# Patient Record
Sex: FEMALE | Race: WHITE | Employment: FULL TIME | URBAN - METROPOLITAN AREA
[De-identification: names, ages, dates, MRNs, and addresses within clinical notes are randomized per-mention and may not be internally consistent; named-entity substitution may affect disease eponyms.]

---

## 2017-03-31 ENCOUNTER — HOSPITAL ENCOUNTER (OUTPATIENT)
Dept: MAMMOGRAPHY | Age: 47
Discharge: HOME | End: 2017-03-31
Attending: FAMILY MEDICINE
Payer: COMMERCIAL

## 2017-03-31 DIAGNOSIS — Z12.31 VISIT FOR SCREENING MAMMOGRAM: ICD-10-CM

## 2017-03-31 PROCEDURE — 77067 SCR MAMMO BI INCL CAD: CPT

## 2017-04-06 ENCOUNTER — HOSPITAL ENCOUNTER (OUTPATIENT)
Dept: ULTRASOUND IMAGING | Age: 47
Discharge: HOME | End: 2017-04-06
Attending: FAMILY MEDICINE
Payer: COMMERCIAL

## 2017-04-06 ENCOUNTER — HOSPITAL ENCOUNTER (OUTPATIENT)
Dept: MAMMOGRAPHY | Age: 47
Discharge: HOME | End: 2017-04-06
Attending: FAMILY MEDICINE
Payer: COMMERCIAL

## 2017-04-06 DIAGNOSIS — R92.8 ABNORMAL MAMMOGRAM: ICD-10-CM

## 2017-04-06 DIAGNOSIS — N64.89 BREAST ASYMMETRY: ICD-10-CM

## 2017-04-06 PROCEDURE — 77065 DX MAMMO INCL CAD UNI: CPT

## 2017-11-08 ENCOUNTER — OFFICE VISIT (OUTPATIENT)
Dept: ORTHOPEDIC SURGERY | Age: 47
End: 2017-11-08

## 2017-11-08 VITALS
RESPIRATION RATE: 16 BRPM | BODY MASS INDEX: 27.64 KG/M2 | DIASTOLIC BLOOD PRESSURE: 66 MMHG | SYSTOLIC BLOOD PRESSURE: 132 MMHG | WEIGHT: 172 LBS | HEIGHT: 66 IN | TEMPERATURE: 97.9 F | HEART RATE: 67 BPM

## 2017-11-08 DIAGNOSIS — M54.16 LUMBAR RADICULOPATHY: ICD-10-CM

## 2017-11-08 DIAGNOSIS — M54.50 LUMBAR SPINE PAIN: Primary | ICD-10-CM

## 2017-11-08 RX ORDER — IBUPROFEN 800 MG/1
TABLET ORAL
COMMUNITY

## 2017-11-08 RX ORDER — PREDNISONE 10 MG/1
10 TABLET ORAL SEE ADMIN INSTRUCTIONS
Qty: 21 TAB | Refills: 0 | Status: SHIPPED | OUTPATIENT
Start: 2017-11-08 | End: 2018-01-22 | Stop reason: ALTCHOICE

## 2017-11-08 RX ORDER — GABAPENTIN 300 MG/1
300 CAPSULE ORAL 3 TIMES DAILY
Qty: 90 CAP | Refills: 2 | Status: SHIPPED | OUTPATIENT
Start: 2017-11-08 | End: 2018-02-02 | Stop reason: SDUPTHER

## 2017-11-08 NOTE — MR AVS SNAPSHOT
Visit Information Date & Time Provider Department Dept. Phone Encounter #  
 11/8/2017  9:15 AM Hermes Allison MD [facility] [phone] [id number] Follow-up Instructions Return in about 6 weeks (around 12/20/2017), or if symptoms worsen or fail to improve. Upcoming Health Maintenance Date Due  
 PAP AKA CERVICAL CYTOLOGY 8/7/2015 Influenza Age 5 to Adult 8/1/2017 DTaP/Tdap/Td series (2 - Td) 9/24/2020 Allergies as of 11/8/2017  Review Complete On: 11/8/2017 By: Wesley Hidalgo LPN Severity Noted Reaction Type Reactions Pcn [Penicillins]  03/24/2010   Topical Rash All cillins Current Immunizations  Never Reviewed Name Date Influenza Vaccine 12/14/2013 Influenza Vaccine Split 11/13/2012, 9/24/2010 PPD 6/28/2011 TDAP Vaccine 9/24/2010 Not reviewed this visit You Were Diagnosed With   
  
 Codes Comments Lumbar spine pain    -  Primary ICD-10-CM: M54.5 ICD-9-CM: 724.2 Lumbar radiculopathy     ICD-10-CM: M54.16 
ICD-9-CM: 724.4 Vitals BP Pulse Temp Resp Height(growth percentile) Weight(growth percentile) 132/66 67 97.9 °F (36.6 °C) (Oral) 16 5' 6\" (1.676 m) 172 lb (78 kg) BMI OB Status Smoking Status 27.76 kg/m2 Having regular periods Former Smoker BMI and BSA Data Body Mass Index Body Surface Area  
 27.76 kg/m 2 1.91 m 2 Preferred Pharmacy Pharmacy Name Phone [facility], [address] [place] [address] Road [phone] Your Updated Medication List  
  
   
This list is accurate as of: 11/8/17 10:19 AM.  Always use your most recent med list.  
  
  
  
  
 * ADVIL 200 mg tablet Generic drug:  ibuprofen Take  by mouth. * ibuprofen 800 mg tablet Commonly known as:  MOTRIN Take  by mouth. amphetamine-dextroamphetamine XR 25 mg XR capsule Commonly known as:  ADDERALL XR  
 Take 1 Cap by mouth every morning. Fill after 2/14/14 cyclobenzaprine 10 mg tablet Commonly known as:  FLEXERIL Take 1 Tab by mouth three (3) times daily as needed for Muscle Spasm(s). gabapentin 300 mg capsule Commonly known as:  NEURONTIN Take 1 Cap by mouth three (3) times daily. naproxen 500 mg tablet Commonly known as:  NAPROSYN Take 1 Tab by mouth two (2) times daily (with meals). PERCOCET PO Take  by mouth.  
  
 predniSONE 10 mg dose pack Commonly known as:  STERAPRED DS Take 1 Tab by mouth See Admin Instructions. See administration instruction per 10mg dose pack SOMA 350 mg tablet Generic drug:  carisoprodol Take 350 mg by mouth daily. TOPAMAX 25 mg tablet Generic drug:  topiramate Take  by mouth two (2) times a day. zolpidem 10 mg tablet Commonly known as:  AMBIEN Take 1 Tab by mouth nightly as needed for Sleep. * Notice: This list has 2 medication(s) that are the same as other medications prescribed for you. Read the directions carefully, and ask your doctor or other care provider to review them with you. Prescriptions Sent to Pharmacy Refills  
 predniSONE (STERAPRED DS) 10 mg dose pack 0 Sig: Take 1 Tab by mouth See Admin Instructions. See administration instruction per 10mg dose pack Class: Normal  
 Pharmacy: [address], [address] [place] [address] Ph #: [phone] Route: Oral  
 gabapentin (NEURONTIN) 300 mg capsule 2 Sig: Take 1 Cap by mouth three (3) times daily. Class: Normal  
 Pharmacy: [address], [address] [place] [address] Ph #: [phone] Route: Oral  
  
We Performed the Following AMB POC XRAY, SPINE, LUMBOSACRAL; 2 O [49914 CPT(R)] REFERRAL TO PHYSICAL THERAPY [XZN37 Custom] Comments:  
 Please limit referral to location specified. eval and treat; Osiris method Pt has lumbar radiculopathy Follow-up Instructions Return in about 6 weeks (around 12/20/2017), or if symptoms worsen or fail to improve. Referral Information Referral ID Referred By Referred To  
  
 [id number]. [address], [address] [place], [address] Phone: [phone] Visits Status Start Date End Date 1 New Request 11/8/17 11/8/18 If your referral has a status of pending review or denied, additional information will be sent to support the outcome of this decision. Patient Instructions Low Back Pain: Exercises Your Care Instructions Here are some examples of typical rehabilitation exercises for your condition. Start each exercise slowly. Ease off the exercise if you start to have pain. Your doctor or physical therapist will tell you when you can start these exercises and which ones will work best for you. How to do the exercises Press-up 1. Lie on your stomach, supporting your body with your forearms. 2. Press your elbows down into the floor to raise your upper back. As you do this, relax your stomach muscles and allow your back to arch without using your back muscles. As your press up, do not let your hips or pelvis come off the floor. 3. Hold for 15 to 30 seconds, then relax. 4. Repeat 2 to 4 times. Alternate arm and leg (bird dog) exercise Do this exercise slowly. Try to keep your body straight at all times, and do not let one hip drop lower than the other. 1. Start on the floor, on your hands and knees. 2. Tighten your belly muscles. 3. Raise one leg off the floor, and hold it straight out behind you. Be careful not to let your hip drop down, because that will twist your trunk. 4. Hold for about 6 seconds, then lower your leg and switch to the other leg. 5. Repeat 8 to 12 times on each leg. 6. Over time, work up to holding for 10 to 30 seconds each time. 7. If you feel stable and secure with your leg raised, try raising the opposite arm straight out in front of you at the same time. Knee-to-chest exercise 1. Lie on your back with your knees bent and your feet flat on the floor. 2. Bring one knee to your chest, keeping the other foot flat on the floor (or keeping the other leg straight, whichever feels better on your lower back). 3. Keep your lower back pressed to the floor. Hold for at least 15 to 30 seconds. 4. Relax, and lower the knee to the starting position. 5. Repeat with the other leg. Repeat 2 to 4 times with each leg. 6. To get more stretch, put your other leg flat on the floor while pulling your knee to your chest. 
Curl-ups 1. Lie on the floor on your back with your knees bent at a 90-degree angle. Your feet should be flat on the floor, about 12 inches from your buttocks. 2. Cross your arms over your chest. If this bothers your neck, try putting your hands behind your neck (not your head), with your elbows spread apart. 3. Slowly tighten your belly muscles and raise your shoulder blades off the floor. 4. Keep your head in line with your body, and do not press your chin to your chest. 
5. Hold this position for 1 or 2 seconds, then slowly lower yourself back down to the floor. 6. Repeat 8 to 12 times. Pelvic tilt exercise 1. Lie on your back with your knees bent. 2. \"Brace\" your stomach. This means to tighten your muscles by pulling in and imagining your belly button moving toward your spine. You should feel like your back is pressing to the floor and your hips and pelvis are rocking back. 3. Hold for about 6 seconds while you breathe smoothly. 4. Repeat 8 to 12 times. Heel dig bridging 1. Lie on your back with both knees bent and your ankles bent so that only your heels are digging into the floor. Your knees should be bent about 90 degrees.  
2. Then push your heels into the floor, squeeze your buttocks, and lift your hips off the floor until your shoulders, hips, and knees are all in a straight line. 3. Hold for about 6 seconds as you continue to breathe normally, and then slowly lower your hips back down to the floor and rest for up to 10 seconds. 4. Do 8 to 12 repetitions. Hamstring stretch in doorway 1. Lie on your back in a doorway, with one leg through the open door. 2. Slide your leg up the wall to straighten your knee. You should feel a gentle stretch down the back of your leg. 3. Hold the stretch for at least 15 to 30 seconds. Do not arch your back, point your toes, or bend either knee. Keep one heel touching the floor and the other heel touching the wall. 4. Repeat with your other leg. 5. Do 2 to 4 times for each leg. Hip flexor stretch 1. Kneel on the floor with one knee bent and one leg behind you. Place your forward knee over your foot. Keep your other knee touching the floor. 2. Slowly push your hips forward until you feel a stretch in the upper thigh of your rear leg. 3. Hold the stretch for at least 15 to 30 seconds. Repeat with your other leg. 4. Do 2 to 4 times on each side. Wall sit 1. Stand with your back 10 to 12 inches away from a wall. 2. Lean into the wall until your back is flat against it. 3. Slowly slide down until your knees are slightly bent, pressing your lower back into the wall. 4. Hold for about 6 seconds, then slide back up the wall. 5. Repeat 8 to 12 times. Follow-up care is a key part of your treatment and safety. Be sure to make and go to all appointments, and call your doctor if you are having problems. It's also a good idea to know your test results and keep a list of the medicines you take. Where can you learn more? Go to http://taina-desean.info/. Enter P082 in the search box to learn more about \"Low Back Pain: Exercises. \" Current as of: March 21, 2017 Content Version: 11.4 © 7501-8310 Healthwise, [place]. Care instructions adapted under license by [employer] (which disclaims liability or warranty for this information). If you have questions about a medical condition or this instruction, always ask your healthcare professional. [address] any warranty or liability for your use of this information. Gabapentin (Neurontin) 300 mg three times a day (TID) daily instructions: 
 
 
 Morning Afternoon Night Week 1 - - 1 pill Week 2 1 pill - 1 pill Week 3 and onwards 1 pill 1 pill 1 pill Week 1: Take one pill each night. Week 2: Take one pill in the morning and one pill at night. Week 3 and onwards: Take one pill in the morning, one pill in the afternoon, and one pill at night. Continue taking this dose each day. Introducing [facility]! Dear Amaris Jeffery: 
Thank you for requesting a [employer] account. Our records indicate that you have previously registered for a [employer] account but its currently inactive. Please call our [employer] support line at [phone]. Additional Information If you have questions, please visit the Frequently Asked Questions section of the [employer] website at [email or web address] Remember, [employer] is NOT to be used for urgent needs. For medical emergencies, dial 911. Now available from your iPhone and Android! Please provide this summary of care documentation to your next provider. Your primary care clinician is listed as Katia Moon. If you have any questions after today's visit, please call [phone].

## 2017-11-08 NOTE — PROGRESS NOTES
[address]  [address], [address]  [place], [address] [place] [address] Street  Phone: [phone]  Fax: [phone]        Leland Patino  : 1970  PCP: Maryanne Garvey DO  2017    NEW PATIENT      ASSESSMENT AND PLAN     Lazarus Rattan comes in to the office today c/o low back pain with radicular symptoms into the RLE. Her symptoms are likely related right L5 radiculopathy. On the examination, she had a positive right straight leg raise and reproducible pain with lumbar flexion. The lumbar XR showed disc space narrowing at the L5-S1 level. I referred her to [facility]. I prescribed her a Prednisone 10 mg dose pack and Gabapentin 300 mg TID. Pt will f/u in 6 weeks or sooner if needed. Diagnoses and all orders for this visit:    1. Lumbar spine pain  -     [65191] L/S 2-3 views  -     REFERRAL TO PHYSICAL THERAPY    2. Lumbar radiculopathy  -     predniSONE (STERAPRED DS) 10 mg dose pack; Take 1 Tab by mouth See Admin Instructions. See administration instruction per 10mg dose pack  -     REFERRAL TO PHYSICAL THERAPY  -     gabapentin (NEURONTIN) 300 mg capsule; Take 1 Cap by mouth three (3) times daily. Follow-up Disposition:  Return in about 6 weeks (around 2017), or if symptoms worsen or fail to improve. CHIEF COMPLAINT  Lazarus Rattan is seen today in consultation at the request of Maryanne Garvey DO for complaints of low back and LLE pain. HISTORY OF PRESENT ILLNESS  Lazarus Rattan is a 49 y.o. female c/o gradual onset of low back pain with radicular symptoms into the lateral aspect of the RLE ending around the knee. She reports her pain started approximately 3 months ago. She currently describes her symptoms as an dual aching 7/10. She notes an occasional sensation of being stabbed in the lower lumbar region. She reports her pain will subside when sitting still and will return as soon as she moves.  She notes that lying down and bending forward will exacerbate her symptoms. She reports her pain is worse throughout the night therefore, she is having difficulty sleeping. She denies numbness or weakness into the lower extremities. She reports a previous laminectomy/diskectomy at the L4-5 level. Pt denies any fevers, chills, nausea, vomiting. Pt denies any chest pain and shortness of breath. Pt denies any ear, nose, and throat problems. Pt denies any fecal or urinary incontinence. PAST MEDICAL HISTORY   Past Medical History:   Diagnosis Date    ADD (attention deficit disorder)     DDD (degenerative disc disease), lumbar 9/24/2010    Headache(784.0)        Past Surgical History:   Procedure Laterality Date    HX BACK SURGERY  11/3/10    Discectomy, laminectomy    HX ORTHOPAEDIC      finger       MEDICATIONS      Current Outpatient Prescriptions   Medication Sig Dispense Refill    OXYCODONE HCL/ACETAMINOPHEN (PERCOCET PO) Take  by mouth.  ibuprofen (MOTRIN) 800 mg tablet Take  by mouth.  amphetamine-dextroamphetamine XR (ADDERALL XR) 25 mg XR capsule Take 1 Cap by mouth every morning. Fill after 2/14/14 30 Cap 0    ibuprofen (ADVIL) 200 mg tablet Take  by mouth.  carisoprodol (SOMA) 350 mg tablet Take 350 mg by mouth daily.  topiramate (TOPAMAX) 25 mg tablet Take  by mouth two (2) times a day.  naproxen (NAPROSYN) 500 mg tablet Take 1 Tab by mouth two (2) times daily (with meals). 60 Tab 1    cyclobenzaprine (FLEXERIL) 10 mg tablet Take 1 Tab by mouth three (3) times daily as needed for Muscle Spasm(s). 15 Tab 0    zolpidem (AMBIEN) 10 mg tablet Take 1 Tab by mouth nightly as needed for Sleep.  30 Tab 0       ALLERGIES    Allergies   Allergen Reactions    Pcn [Penicillins] Rash     All cillins          SOCIAL HISTORY    Social History     Social History    Marital status:      Spouse name: N/A    Number of children: N/A    Years of education: N/A     Social History Main Topics    Smoking status: Former Smoker     Types: Cigarettes     Quit date: 6/26/1998    Smokeless tobacco: Never Used      Comment: 5-10 cigarettes per day    Alcohol use 3.0 oz/week     3 Glasses of wine, 3 Cans of beer per week    Drug use: No    Sexual activity: Yes     Partners: Male     Birth control/ protection: None      Comment: vasectomy     Other Topics Concern    None     Social History Narrative     Social History Narrative      Problem Relation Age of Onset    Heart Disease Maternal Grandmother     Other Maternal Grandmother      alzhiemer's    Parkinsonism Paternal Grandfather        REVIEW OF SYSTEMS  Review of Systems   Constitutional: Negative for chills, diaphoresis, fever, malaise/fatigue and weight loss. Respiratory: Negative for shortness of breath. Cardiovascular: Negative for chest pain and leg swelling. Gastrointestinal: Negative for constipation, nausea and vomiting. Neurological: Negative for dizziness, tingling, seizures, loss of consciousness, weakness and headaches. Psychiatric/Behavioral: The patient does not have insomnia. PHYSICAL EXAMINATION  Visit Vitals    /66    Pulse 67    Temp 97.9 °F (36.6 °C) (Oral)    Resp 16    Ht 5' 6\" (1.676 m)    Wt 172 lb (78 kg)    BMI 27.76 kg/m2       Pain Assessment  11/8/2017   Location of Pain Back;Leg   Location Modifiers Right   Severity of Pain 7   Quality of Pain Dull;Aching; Sharp   Frequency of Pain Constant   Result of Injury No       Constitutional:  Well developed, well nourished, in no acute distress. Psychiatric: Affect and mood are appropriate. HEENT: Normocephalic, atraumatic. Extraocular movements intact. Integumentary: No rashes or abrasions noted on exposed areas. Cardiovascular: Regular rate and rhythm. Pulmonary: Clear to auscultation bilaterally. SPINE/MUSCULOSKELETAL EXAM    Cervical spine:  Neck is midline. Normal muscle tone. No focal atrophy is noted. ROM pain free.    Shoulder ROM intact. No tenderness to palpation. Negative Spurling's sign. Negative Tinel's sign. Negative Pantoja's sign. Sensation in the bilateral arms grossly intact to light touch. Lumbar spine:  No rash, ecchymosis, or gross obliquity. No fasciculations. No focal atrophy is noted. No pain with hip ROM. Full range of motion. No tenderness to palpation. No tenderness to palpation at the sciatic notch. SI joints non-tender. Trochanters non tender. Pain with back flexion      Sensation in the bilateral legs grossly intact to light touch. MOTOR:      Biceps  Triceps Deltoids Wrist Ext Wrist Flex Hand Intrin   Right 5/5 5/5 5/5 5/5 5/5 5/5   Left 5/5 5/5 5/5 5/5 5/5 5/5             Hip Flex  Quads Hamstrings Ankle DF EHL Ankle PF   Right 5/5 5/5 5/5 5/5 5/5 5/5   Left 5/5 5/5 5/5 5/5 5/5 5/5     DTRs are 2+ biceps, triceps, brachioradialis, patella, and Achilles. Positive right Straight Leg raise. Squat not tested. No difficulty with tandem gait. Ambulation without assistive device. FWB. RADIOGRAPHS  Lumbar Xr images taken on 11/08/2017 personally reviewed with patient:  No obvious fractures  Disc space narrowing at L5-S1  Mild facet sclerosis      reviewed    Ms. Trenton Monreal has a reminder for a \"due or due soon\" health maintenance. I have asked that she contact her primary care provider for follow-up on this health maintenance. This plan was reviewed with the patient and patient agrees. All questions were answered. More than half of this visit today was spent on counseling. Written by APTwaters, as dictated by Dr. Claribel Crews. I, Dr. Claribel Crews, confirm that all documentation is accurate.

## 2017-11-08 NOTE — PATIENT INSTRUCTIONS

## 2017-11-14 ENCOUNTER — HOSPITAL ENCOUNTER (OUTPATIENT)
Dept: PHYSICAL THERAPY | Age: 47
Discharge: HOME | End: 2017-11-14
Payer: COMMERCIAL

## 2017-11-14 PROCEDURE — 97161 PT EVAL LOW COMPLEX 20 MIN: CPT

## 2017-11-14 PROCEDURE — 97110 THERAPEUTIC EXERCISES: CPT

## 2017-11-14 NOTE — PROGRESS NOTES
PT DAILY TREATMENT NOTE     Patient Name: Artur Corley  Date:2017  : 1970  [x]  Patient  Verified  Payor: Say Salazar / Plan: Michael Petite / Product Type: Commerical /    In time:2:00  Out time:2:38  Total Treatment Time (min): 38  Visit #: 1 of 8    Treatment Area: Low back pain [M54.5]    SUBJECTIVE  Pain Level (0-10 scale): 1/10  Any medication changes, allergies to medications, adverse drug reactions, diagnosis change, or new procedure performed?: [x] No    [] Yes (see summary sheet for update)  Subjective functional status/changes:   [] No changes reported  The patient has a chief complaint of LBP and R LE cramping and pain. OBJECTIVE  28 min []Eval                  []Re-Eval       10 min Therapeutic Exercise:  [x] See flow sheet :   Rationale: increase ROM and increase strength to improve the patients ability to improve ADL ease. With   [] TE   [] TA   [] neuro   [] other: Patient Education: [x] Review HEP    [] Progressed/Changed HEP based on:   [] positioning   [] body mechanics   [] transfers   [] heat/ice application    [] other:      Other Objective/Functional Measures: See IE     Pain Level (0-10 scale) post treatment: 1/10    ASSESSMENT/Changes in Function: See POC. Patient will continue to benefit from skilled PT services to modify and progress therapeutic interventions, address functional mobility deficits, address ROM deficits, address strength deficits, analyze and address soft tissue restrictions, analyze and cue movement patterns, analyze and modify body mechanics/ergonomics, assess and modify postural abnormalities and instruct in home and community integration to attain remaining goals. [x]  See Plan of Care  []  See progress note/recertification  []  See Discharge Summary         Progress towards goals / Updated goals:  Short Term Goals:  To be accomplished in 2 weeks:                         1. The patient will be independent and compliant with HEP to maximize therapeutic benefit. 2. The patient will perform bridge void of HS cramp through R LE with full range to improve ADL efficiency. Long Term Goals: To be accomplished in 4 weeks:                         1. The patient will improve FOTO score to 75 to maximize quality of life. 2. The patient will improve Hip extension/ABD strength to 4+/5 MMT to maximize stability during ADLs. 3. The patient will report not awakening at night due to R HS cramping in order to improve ease of sleep. 4. The patient will report abolishment of R LE pain in order to maximize ADL ease.     PLAN  []  Upgrade activities as tolerated     [x]  Continue plan of care  []  Update interventions per flow sheet       []  Discharge due to:_  []  Other:_      Erasmo Deng PT 11/14/2017  2:46 PM    Future Appointments  Date Time Provider Scout Rooney   11/21/2017 3:30 PM Erasmo Deng PT [id number]   11/28/2017 2:30 [facility]   12/20/2017 9:45 AM Keri Hernandez  [address]

## 2017-11-14 NOTE — PROGRESS NOTES
[facility]  [address]  [facility], [address]  [phone] fax    Plan of Care/ Statement of Necessity for Physical Therapy Services    Patient name: Aleksandra Persaud Start of Care: 2017   Referral source: Jaida Lamas MD : 1970    Medical Diagnosis: Low back pain [M54.5]   Onset Date: 3 months prior, most recent exacerbaltion 2-3 weeks ago. Treatment Diagnosis: LBP with R sided radiculopathy   Prior Hospitalization: see medical history Provider#: [id number]   Medications: Verified on Patient summary List    Comorbidities: Hx laminectomy   Prior Level of Function: The patient states that she had improved ease of transfers. The Plan of Care and following information is based on the information from the initial evaluation. Assessment/ key information: The patient is a 49year old female with a chief complaint of LBP and occasional R sided LE pain. She states that her pain initiated again 2-3 weeks ago which was quite severe at that time with a chief complaint of a distribution through her lateral right thigh and low back. She states that she was issued prednisone which has greatly improved her pain as well as her ability to perform transfers. She has had recent lumbar radiographs which showed narrowed disc height at L5 - S1 per patient report. She has signs and symptoms consistent with lumbar radiculopathy with associated impairments consisting of pain, decreased ROM, decreased flexibility, decreased strength, and limited ADL ease. The patient will benefit from skilled PT in order to address the aforementioned impairments.     Evaluation Complexity History MEDIUM  Complexity : 1-2 comorbidities / personal factors will impact the outcome/ POC ; Examination MEDIUM Complexity : 3 Standardized tests and measures addressing body structure, function, activity limitation and / or participation in recreation  ;Presentation MEDIUM Complexity : Evolving with changing characteristics  ; Clinical Decision Making MEDIUM Complexity : FOTO score of 26-74  Overall Complexity Rating: LOW   Problem List: pain affecting function, decrease ROM, decrease strength, decrease ADL/ functional abilitiies, decrease activity tolerance and decrease flexibility/ joint mobility   Treatment Plan may include any combination of the following: Therapeutic exercise, Therapeutic activities, Neuromuscular re-education, Physical agent/modality, Manual therapy and Patient education  Patient / Family readiness to learn indicated by: asking questions, trying to perform skills and interest  Persons(s) to be included in education: patient (P)  Barriers to Learning/Limitations: None  Patient Goal (s): core strength  Patient Self Reported Health Status: excellent  Rehabilitation Potential: good    Short Term Goals: To be accomplished in 2 weeks:   1. The patient will be independent and compliant with HEP to maximize therapeutic benefit. 2. The patient will perform bridge void of HS cramp through R LE with full range to improve ADL efficiency. Long Term Goals: To be accomplished in 4 weeks:   1. The patient will improve FOTO score to 75 to maximize quality of life. 2. The patient will improve Hip extension/ABD strength to 4+/5 MMT to maximize stability during ADLs. 3. The patient will report not awakening at night due to R HS cramping in order to improve ease of sleep. 4. The patient will report abolishment of R LE pain in order to maximize ADL ease. Frequency / Duration: Patient to be seen 2 times per week for 4 weeks.     Patient/ Caregiver education and instruction: Diagnosis, prognosis, self care, activity modification and exercises   [x]  Plan of care has been reviewed with WALTER Chaney, PT 11/14/2017 2:37 PM    ________________________________________________________________________    I certify that the above Therapy Services are being furnished while the patient is under my care. I agree with the treatment plan and certify that this therapy is necessary.     [de-identified] Signature:____________________  Date:____________Time: _________    Please sign and return to [facility] [address]  [place], [facility] [address]  [phone] fax

## 2017-11-21 ENCOUNTER — APPOINTMENT (OUTPATIENT)
Dept: PHYSICAL THERAPY | Age: 47
End: 2017-11-21
Payer: COMMERCIAL

## 2017-11-28 ENCOUNTER — APPOINTMENT (OUTPATIENT)
Dept: PHYSICAL THERAPY | Age: 47
End: 2017-11-28
Payer: COMMERCIAL

## 2018-01-15 NOTE — PROGRESS NOTES
[facility] [address] [address] [address]  [place], [address]  [phone] fax    Physical Therapy Discharge Summary  Patient name: Mele Conklin Start of Care: 2017   Referral source: Bernardo Riggs MD : 1970                          Medical Diagnosis: Low back pain [M54.5] Onset Date: 3 months prior, most recent exacerbaltion 2-3 weeks ago. Treatment Diagnosis: LBP with R sided radiculopathy   Prior Hospitalization: see medical history Provider#: [id number]   Medications: Verified on Patient summary List    Comorbidities: Hx laminectomy   Prior Level of Function: The patient states that she had improved ease of transfers. Visits from Start of Care: 1    Missed Visits: 1  Reporting Period : 2017 to 2017    Summary of Care:  Short Term Goals: To be accomplished in 2 weeks:                         1. The patient will be independent and compliant with HEP to maximize therapeutic benefit.                         2. The patient will perform bridge void of HS cramp through R LE with full range to improve ADL efficiency.              [address] be accomplished in 4 weeks:                         1. The patient will improve FOTO score to 75 to maximize quality of life.                         2. The patient will improve Hip extension/ABD strength to 4+/5 MMT to maximize stability during ADLs.                        8. The patient will report not awakening at night due to R HS cramping in order to improve ease of sleep.                         4. The patient will report abolishment of R LE pain in order to maximize ADL ease.       ASSESSMENT/RECOMMENDATIONS: Unable to further assess progress towards goals at this time due to non-compliance/lack of attendance. DC at this time with no further instructions to the patient.   Thank you for this referral.    [x]Discontinue therapy: []Patient has reached or is progressing toward set goals      [x]Patient is non-compliant or has abdicated      []Due to lack of appreciable progress towards set [address], PT 1/15/2018 2:30 PM

## 2018-01-22 ENCOUNTER — OFFICE VISIT (OUTPATIENT)
Dept: ORTHOPEDIC SURGERY | Age: 48
End: 2018-01-22

## 2018-01-22 VITALS
BODY MASS INDEX: 27.64 KG/M2 | WEIGHT: 172 LBS | OXYGEN SATURATION: 99 % | HEART RATE: 84 BPM | HEIGHT: 66 IN | DIASTOLIC BLOOD PRESSURE: 78 MMHG | SYSTOLIC BLOOD PRESSURE: 132 MMHG | RESPIRATION RATE: 18 BRPM | TEMPERATURE: 98.2 F

## 2018-01-22 DIAGNOSIS — M54.16 LUMBAR RADICULOPATHY: Primary | ICD-10-CM

## 2018-01-22 RX ORDER — ESCITALOPRAM OXALATE 20 MG/1
20 TABLET ORAL DAILY
COMMUNITY

## 2018-01-22 RX ORDER — DEXTROAMPHETAMINE SACCHARATE, AMPHETAMINE ASPARTATE MONOHYDRATE, DEXTROAMPHETAMINE SULFATE AND AMPHETAMINE SULFATE 7.5; 7.5; 7.5; 7.5 MG/1; MG/1; MG/1; MG/1
30 CAPSULE, EXTENDED RELEASE ORAL
COMMUNITY

## 2018-01-22 NOTE — MR AVS SNAPSHOT
[address] [address] [address] [facility] [address] 
[phone] Patient: Riri Martino MRN: [id number] :1970 Visit Information Date & Time Provider Department Dept. Phone Encounter #  
 2018  9:45 AM Kandis Alas MD [facility] [phone] [id number] Follow-up Instructions Return in about 2 weeks (around 2018), or if symptoms worsen or fail to improve. Upcoming Health Maintenance Date Due  
 PAP AKA CERVICAL CYTOLOGY 2015 Influenza Age 5 to Adult 2017 DTaP/Tdap/Td series (2 - Td) 2020 Allergies as of 2018  Review Complete On: 2018 By: Dagoberto Garcia Severity Noted Reaction Type Reactions Pcn [Penicillins]  2010   Topical Rash All cillins Current Immunizations  Never Reviewed Name Date Influenza Vaccine 2013 Influenza Vaccine Split 2012, 2010 PPD 2011 TDAP Vaccine 2010 Not reviewed this visit You Were Diagnosed With   
  
 Codes Comments Lumbar radiculopathy    -  Primary ICD-10-CM: M54.16 
ICD-9-CM: 724.4 Vitals BP Pulse Temp Resp Height(growth percentile) Weight(growth percentile) 132/78 84 98.2 °F (36.8 °C) 18 5' 6\" (1.676 m) 172 lb (78 kg) SpO2 BMI OB Status Smoking Status 99% 27.76 kg/m2 Having regular periods Former Smoker BMI and BSA Data Body Mass Index Body Surface Area  
 27.76 kg/m 2 1.91 m 2 Preferred Pharmacy Pharmacy Name Phone Rena Nunez [address], [address] [phone] Your Updated Medication List  
  
   
This list is accurate as of: 18 10:50 AM.  Always use your most recent med list.  
  
  
  
  
 * ADVIL 200 mg tablet Generic drug:  ibuprofen Take  by mouth. * ibuprofen 800 mg tablet Commonly known as:  MOTRIN Take  by mouth. * ADDERALL XR 30 mg XR capsule Generic drug:  amphetamine-dextroamphetamine XR Take 30 mg by mouth every morning. * amphetamine-dextroamphetamine XR 25 mg XR capsule Commonly known as:  ADDERALL XR Take 1 Cap by mouth every morning. Fill after 2/14/14 cyclobenzaprine 10 mg tablet Commonly known as:  FLEXERIL Take 1 Tab by mouth three (3) times daily as needed for Muscle Spasm(s). gabapentin 300 mg capsule Commonly known as:  NEURONTIN Take 1 Cap by mouth three (3) times daily. LEXAPRO 20 mg tablet Generic drug:  escitalopram oxalate Take 20 mg by mouth daily. naproxen 500 mg tablet Commonly known as:  NAPROSYN Take 1 Tab by mouth two (2) times daily (with meals). PERCOCET PO Take  by mouth. SOMA 350 mg tablet Generic drug:  carisoprodol Take 350 mg by mouth daily. TOPAMAX 25 mg tablet Generic drug:  topiramate Take  by mouth two (2) times a day. zolpidem 10 mg tablet Commonly known as:  AMBIEN Take 1 Tab by mouth nightly as needed for Sleep. * Notice: This list has 4 medication(s) that are the same as other medications prescribed for you. Read the directions carefully, and ask your doctor or other care provider to review them with you. Follow-up Instructions Return in about 2 weeks (around 2/5/2018), or if symptoms worsen or fail to improve. To-Do List   
 01/29/2018 Imaging:  MRI LUMB SPINE WO CONT Introducing [facility] & HEALTH SERVICES! Dear Josiah guerra: 
Thank you for requesting a [employer] account. Our records indicate that you have previously registered for a [employer] account but its currently inactive. Please call our [employer] support line at [phone]. Additional Information If you have questions, please visit the Frequently Asked Questions section of the [employer] website at [email or web address] Remember, [employer] is NOT to be used for urgent needs.  For medical emergencies, dial 911. Now available from your iPhone and Android! Please provide this summary of care documentation to your next provider. Your primary care clinician is listed as PROVIDER UNKNOWN. If you have any questions after today's visit, please call [phone].

## 2018-01-22 NOTE — PROGRESS NOTES
[address], [address]  [place], [address] [place] [address] Street  Phone: [phone]  Fax: [phone]        Kamilah Park  : 1970  PCP: PROVIDER UNKNOWN  2018    PROGRESS NOTE      ASSESSMENT AND PLAN    Jacqueline Zelaya comes in to the office today for a PT f/u. She transitioned into an HEP which included Osiris type exercises. This significantly improved her radicular pains. They are more intermittent now. I will obtain a lumbar MRI to assess the structures related to her radiculopathy. She will remain on her current dose of Gabapentin 300 mg TID. She may try to back off on this medication to try to determine how much it has been helping. Pt will f/u in 2 weeks or sooner as needed. Diagnoses and all orders for this visit:    1. Lumbar radiculopathy  -     MRI LUMB SPINE WO CONT; Future       Follow-up Disposition:  Return in about 2 weeks (around 2018), or if symptoms worsen or fail to improve. HISTORY OF PRESENT ILLNESS  Jacqueline Zelaya is a 49 y.o. female. A&P / HPI from 2017:  Jacqueline Zelaya comes in to the office today c/o low back pain with radicular symptoms into the RLE. Her symptoms are likely related right L5 radiculopathy.      On the examination, she had a positive right straight leg raise and reproducible pain with lumbar flexion. The lumbar XR showed disc space narrowing at the L5-S1 level.      I referred her to [facility] PT. I prescribed her a Prednisone 10 mg dose pack and Gabapentin 300 mg TID. HISTORY OF PRESENT ILLNESS  Jacqueline Zelaya is a 49 y.o. female c/o gradual onset of low back pain with radicular symptoms into the lateral aspect of the RLE ending around the knee. She reports her pain started approximately 3 months ago. She currently describes her symptoms as an dual aching /10. She notes an occasional sensation of being stabbed in the lower lumbar region.  She reports her pain will subside when sitting still and will return as soon as she moves. She notes that lying down and bending forward will exacerbate her symptoms. She reports her pain is worse throughout the night therefore, she is having difficulty sleeping. She denies numbness or weakness into the lower extremities. She reports a previous laminectomy/diskectomy at the L4-5 level.      Pt denies any fevers, chills, nausea, vomiting. Pt denies any chest pain and shortness of breath. Pt denies any ear, nose, and throat problems. Pt denies any fecal or urinary incontinence.        Updates from 01/22/18:  Pt presents for a PT f/u. She was unable to complete the PT due to financial concerns. However, she transitioned into an HEP which has provided significant improvement. She has found great relief with the back extension stretches and Osiris exercises. The Prednisone 10 mg dose pack significantly decreased her pain levels. PAST MEDICAL HISTORY   Past Medical History:   Diagnosis Date    ADD (attention deficit disorder)     DDD (degenerative disc disease), lumbar 9/24/2010    Headache(784.0)        Past Surgical History:   Procedure Laterality Date    HX BACK SURGERY  11/3/10    Discectomy, laminectomy    HX ORTHOPAEDIC      finger   . MEDICATIONS      Current Outpatient Prescriptions   Medication Sig Dispense Refill    amphetamine-dextroamphetamine XR (ADDERALL XR) 30 mg XR capsule Take 30 mg by mouth every morning.  escitalopram oxalate (LEXAPRO) 20 mg tablet Take 20 mg by mouth daily.  OXYCODONE HCL/ACETAMINOPHEN (PERCOCET PO) Take  by mouth.  gabapentin (NEURONTIN) 300 mg capsule Take 1 Cap by mouth three (3) times daily. 90 Cap 2    ibuprofen (ADVIL) 200 mg tablet Take  by mouth.  ibuprofen (MOTRIN) 800 mg tablet Take  by mouth.  carisoprodol (SOMA) 350 mg tablet Take 350 mg by mouth daily.  topiramate (TOPAMAX) 25 mg tablet Take  by mouth two (2) times a day.       naproxen (NAPROSYN) 500 mg tablet Take 1 Tab by mouth two (2) times daily (with meals). 60 Tab 1    amphetamine-dextroamphetamine XR (ADDERALL XR) 25 mg XR capsule Take 1 Cap by mouth every morning. Fill after 2/14/14 30 Cap 0    cyclobenzaprine (FLEXERIL) 10 mg tablet Take 1 Tab by mouth three (3) times daily as needed for Muscle Spasm(s). 15 Tab 0    zolpidem (AMBIEN) 10 mg tablet Take 1 Tab by mouth nightly as needed for Sleep. 30 Tab 0        ALLERGIES    Allergies   Allergen Reactions    Pcn [Penicillins] Rash     All cillins          SOCIAL HISTORY    Social History     Social History    Marital status:      Spouse name: N/A    Number of children: N/A    Years of education: N/A     Social History Main Topics    Smoking status: Former Smoker     Types: Cigarettes     Quit date: 6/26/1998    Smokeless tobacco: Never Used      Comment: 5-10 cigarettes per day    Alcohol use 3.0 oz/week     3 Glasses of wine, 3 Cans of beer per week    Drug use: No    Sexual activity: Yes     Partners: Male     Birth control/ protection: None      Comment: vasectomy     Other Topics Concern    Not on file     Social History Narrative       FAMILY HISTORY    Family History   Problem Relation Age of Onset    Heart Disease Maternal Grandmother     Other Maternal Grandmother      alzhiemer's    Parkinsonism Paternal Grandfather          REVIEW OF SYSTEMS  Review of Systems   Constitutional: Negative for chills, diaphoresis, fever, malaise/fatigue and weight loss. Respiratory: Negative for shortness of breath. Cardiovascular: Negative for chest pain and leg swelling. Gastrointestinal: Negative for constipation, nausea and vomiting. Neurological: Negative for dizziness, tingling, seizures, loss of consciousness, weakness and headaches. Psychiatric/Behavioral: The patient does not have insomnia.            PHYSICAL EXAMINATION  Visit Vitals    /78    Pulse 84    Temp 98.2 °F (36.8 °C)    Resp 18    Ht 5' 6\" (1.676 m)    Wt 172 lb (78 kg)    SpO2 99%    BMI 27.76 kg/m2       Pain Assessment  11/8/2017   Location of Pain Back;Leg   Location Modifiers Right   Severity of Pain 7   Quality of Pain Dull;Aching; Sharp   Frequency of Pain Constant   Result of Injury No           Constitutional:  Well developed, well nourished, in no acute distress. Psychiatric: Affect and mood are appropriate. Integumentary: No rashes or abrasions noted on exposed areas. SPINE/MUSCULOSKELETAL EXAM       Cervical spine:  Neck is midline. Normal muscle tone. No focal atrophy is noted. ROM pain free. Shoulder ROM intact. No tenderness to palpation. Negative Spurling's sign. Negative Tinel's sign. Negative Pantoja's sign.       Sensation in the bilateral arms grossly intact to light touch.      Lumbar spine:  No rash, ecchymosis, or gross obliquity. No fasciculations. No focal atrophy is noted. No pain with hip ROM. Full range of motion. No tenderness to palpation. No tenderness to palpation at the sciatic notch. SI joints non-tender. Trochanters non tender. Pain with back flexion       Sensation in the bilateral legs grossly intact to light touch. MOTOR:      Biceps  Triceps Deltoids Wrist Ext Wrist Flex Hand Intrin   Right 5/5 5/5 5/5 5/5 5/5 5/5   Left 5/5 5/5 5/5 5/5 5/5 5/5             Hip Flex  Quads Hamstrings Ankle DF EHL Ankle PF   Right 5/5 5/5 5/5 5/5 5/5 5/5   Left 5/5 5/5 5/5 5/5 5/5 5/5        DTRs are 2+ biceps, triceps, brachioradialis, patella, and Achilles.     Positive right Straight Leg raise. Squat not tested. No difficulty with tandem gait.      Ambulation without assistive device. FWB. RADIOGRAPHS  Lumbar Xr images taken on 11/08/2017 personally reviewed with patient:  No obvious fractures  Disc space narrowing at L5-S1  Mild facet sclerosis       reviewed     Ms. Indigo Patel has a reminder for a \"due or due soon\" health maintenance.  I have asked that she contact her primary care provider for follow-up on this health maintenance.      This plan was reviewed with the patient and patient agrees. All questions were answered. More than half of this visit today was spent on counseling.     Written by Naila Brownlee, as dictated by Dr. Dolly Pagan, Dr. Amber Coffey, confirm that all documentation is accurate.

## 2018-01-24 ENCOUNTER — TELEPHONE (OUTPATIENT)
Dept: ORTHOPEDIC SURGERY | Age: 48
End: 2018-01-24

## 2018-01-24 NOTE — TELEPHONE ENCOUNTER
Order and office notes [facility]ed [facility] to have them set up MRI L-spine and to notify patient of date. They will authorize with insurance if needed. Patient aware.

## 2018-02-02 DIAGNOSIS — M54.16 LUMBAR RADICULOPATHY: ICD-10-CM

## 2018-02-02 RX ORDER — GABAPENTIN 300 MG/1
CAPSULE ORAL
Qty: 90 CAP | Refills: 1 | Status: SHIPPED | OUTPATIENT
Start: 2018-02-02 | End: 2018-04-06 | Stop reason: SDUPTHER

## 2018-02-19 ENCOUNTER — OFFICE VISIT (OUTPATIENT)
Dept: ORTHOPEDIC SURGERY | Age: 48
End: 2018-02-19

## 2018-02-19 VITALS
HEIGHT: 66 IN | SYSTOLIC BLOOD PRESSURE: 131 MMHG | OXYGEN SATURATION: 100 % | HEART RATE: 67 BPM | BODY MASS INDEX: 28.45 KG/M2 | WEIGHT: 177 LBS | TEMPERATURE: 97.7 F | DIASTOLIC BLOOD PRESSURE: 66 MMHG | RESPIRATION RATE: 18 BRPM

## 2018-02-19 DIAGNOSIS — M54.16 LUMBAR RADICULOPATHY: Primary | ICD-10-CM

## 2018-02-19 NOTE — MR AVS SNAPSHOT
[address] [facility] [address] [facility] [address] 
[phone] Patient: Sury Abbasi MRN: [id number] :1970 Visit Information Date & Time Provider Department Dept. Phone Encounter #  
 2018 10:15 AM Hansel Ocampo MD [facility] [phone] [id number] Follow-up Instructions Return in about 6 months (around 2018). Upcoming Health Maintenance Date Due  
 PAP AKA CERVICAL CYTOLOGY 2015 Influenza Age 5 to Adult 2017 DTaP/Tdap/Td series (2 - Td) 2020 Allergies as of 2018  Review Complete On: 2018 By: Hansel Ocampo MD  
  
 Severity Noted Reaction Type Reactions Pcn [Penicillins]  2010   Topical Rash All cillins Current Immunizations  Never Reviewed Name Date Influenza Vaccine 2013 Influenza Vaccine Split 2012, 2010 PPD 2011 TDAP Vaccine 2010 Not reviewed this visit You Were Diagnosed With   
  
 Codes Comments Lumbar radiculopathy    -  Primary ICD-10-CM: M54.16 
ICD-9-CM: 724.4 Vitals BP Pulse Temp Resp Height(growth percentile) Weight(growth percentile) 131/66 67 97.7 °F (36.5 °C) (Oral) 18 5' 6\" (1.676 m) 177 lb (80.3 kg) SpO2 BMI OB Status Smoking Status 100% 28.57 kg/m2 Having regular periods Former Smoker Vitals History BMI and BSA Data Body Mass Index Body Surface Area 28.57 kg/m 2 1.93 m 2 Preferred Pharmacy Pharmacy Name Phone Ayanna Pope [address], [address] [phone] Your Updated Medication List  
  
   
This list is accurate as of: 18 11:58 AM.  Always use your most recent med list.  
  
  
  
  
 * ADVIL 200 mg tablet Generic drug:  ibuprofen Take  by mouth. * ibuprofen 800 mg tablet Commonly known as:  MOTRIN Take  by mouth. * ADDERALL XR 30 mg XR capsule Generic drug:  amphetamine-dextroamphetamine XR Take 30 mg by mouth every morning. * amphetamine-dextroamphetamine XR 25 mg XR capsule Commonly known as:  ADDERALL XR Take 1 Cap by mouth every morning. Fill after 2/14/14 cyclobenzaprine 10 mg tablet Commonly known as:  FLEXERIL Take 1 Tab by mouth three (3) times daily as needed for Muscle Spasm(s). gabapentin 300 mg capsule Commonly known as:  NEURONTIN  
TAKE ONE CAPSULE BY MOUTH THREE TIMES A DAY LEXAPRO 20 mg tablet Generic drug:  escitalopram oxalate Take 20 mg by mouth daily. naproxen 500 mg tablet Commonly known as:  NAPROSYN Take 1 Tab by mouth two (2) times daily (with meals). PERCOCET PO Take  by mouth. SOMA 350 mg tablet Generic drug:  carisoprodol Take 350 mg by mouth daily. TOPAMAX 25 mg tablet Generic drug:  topiramate Take  by mouth two (2) times a day. zolpidem 10 mg tablet Commonly known as:  AMBIEN Take 1 Tab by mouth nightly as needed for Sleep. * Notice: This list has 4 medication(s) that are the same as other medications prescribed for you. Read the directions carefully, and ask your doctor or other care provider to review them with you. Follow-up Instructions Return in about 6 months (around 8/19/2018). Introducing [facility] & HEALTH SERVICES! Dear Shital Saldaña: 
Thank you for requesting a [employer] account. Our records indicate that you have previously registered for a [employer] account but its currently inactive. Please call our [employer] support line at [phone]. Additional Information If you have questions, please visit the Frequently Asked Questions section of the [employer] website at [email or web address] Remember, [employer] is NOT to be used for urgent needs. For medical emergencies, dial 911. Now available from your iPhone and Android! Please provide this summary of care documentation to your next provider. Your primary care clinician is listed as PROVIDER UNKNOWN. If you have any questions after today's visit, please call [phone].

## 2018-02-19 NOTE — PROGRESS NOTES
[address]  [address], [address]  [place], [address] [place] [address] Street  Phone: [phone]  Fax: [phone]        Dwain Guevara  : 1970  PCP: PROVIDER UNKNOWN  2018    PROGRESS NOTE      ASSESSMENT AND PLAN    Yamileth Scruggs comes in to the office today for MRI f/u. She is doing better. Although the MRI impression did not note a right-sided lumbar radiculopathy, per my read, I notice a right L5-S1 foraminal narrowing possibly causing impingement on the right nerve root. Her symptoms are concordant with this finding. She will continue with her Gabapentin 300 mg TID. She may try to back off on this medication to determine how much it has been helping. I advised her to continue her HEP, especially focusing on her core strength. Pt will f/u in 6 months or sooner as needed. Diagnoses and all orders for this visit:    1. Lumbar radiculopathy         Follow-up Disposition: Not on File      HISTORY OF PRESENT ILLNESS  Yamileth Scruggs is a 49 y.o. female. A&P / HPI from 2017:  Eldon еленаmaria esther in to the office today c/o low back pain with radicular symptoms into the RLE. Her symptoms are likely related right L5 radiculopathy.       On the examination, she had a positive right straight leg raise and reproducible pain with lumbar flexion. The lumbar XR showed disc space narrowing at the L5-S1 level.       I referred her to [facility] PT. I prescribed her a Prednisone 10 mg dose pack and Gabapentin 300 mg TID.      HISTORY OF PRESENT ILLNESS  Laura Del Valle is a 49 y.o. female c/o gradual onset of low back pain with radicular symptoms into the lateral aspect of the RLE ending around the knee. She reports her pain started approximately 3 months ago. She currently describes her symptoms as an dual aching 7/10. She notes an occasional sensation of being stabbed in the lower lumbar region.  She reports her pain will subside when sitting still and will return as soon as she moves. She notes that lying down and bending forward will exacerbate her symptoms. She reports her pain is worse throughout the night therefore, she is having difficulty sleeping. She denies numbness or weakness into the lower extremities. She reports a previous laminectomy/diskectomy at the L4-5 level.       Pt denies any fevers, chills, nausea, vomiting. Pt denies any chest pain and shortness of breath. Pt denies any ear, nose, and throat problems. Pt denies any fecal or urinary incontinence.         Updates from 01/22/18:  Pt presents for a PT f/u. She was unable to complete the PT due to financial concerns. However, she transitioned into an HEP which has provided significant improvement. She has found great relief with the back extension stretches and Osiris exercises. The Prednisone 10 mg dose pack significantly decreased her pain levels. Updates from 02/19/18:  Pt presents for MRI f/u. She has continued low back pain with intermittent radicular symptoms in her legs. PAST MEDICAL HISTORY   Past Medical History:   Diagnosis Date    ADD (attention deficit disorder)     DDD (degenerative disc disease), lumbar 9/24/2010    Headache(784.0)        Past Surgical History:   Procedure Laterality Date    HX BACK SURGERY  11/3/10    Discectomy, laminectomy    HX ORTHOPAEDIC      finger   . MEDICATIONS    Current Outpatient Prescriptions   Medication Sig Dispense Refill    gabapentin (NEURONTIN) 300 mg capsule TAKE ONE CAPSULE BY MOUTH THREE TIMES A DAY 90 Cap 1    amphetamine-dextroamphetamine XR (ADDERALL XR) 30 mg XR capsule Take 30 mg by mouth every morning.  escitalopram oxalate (LEXAPRO) 20 mg tablet Take 20 mg by mouth daily.  OXYCODONE HCL/ACETAMINOPHEN (PERCOCET PO) Take  by mouth.  ibuprofen (MOTRIN) 800 mg tablet Take  by mouth.  carisoprodol (SOMA) 350 mg tablet Take 350 mg by mouth daily.       topiramate (TOPAMAX) 25 mg tablet Take  by mouth two (2) times a day.  naproxen (NAPROSYN) 500 mg tablet Take 1 Tab by mouth two (2) times daily (with meals). 60 Tab 1    amphetamine-dextroamphetamine XR (ADDERALL XR) 25 mg XR capsule Take 1 Cap by mouth every morning. Fill after 2/14/14 30 Cap 0    ibuprofen (ADVIL) 200 mg tablet Take  by mouth.  cyclobenzaprine (FLEXERIL) 10 mg tablet Take 1 Tab by mouth three (3) times daily as needed for Muscle Spasm(s). 15 Tab 0    zolpidem (AMBIEN) 10 mg tablet Take 1 Tab by mouth nightly as needed for Sleep. 30 Tab 0        ALLERGIES  Allergies   Allergen Reactions    Pcn [Penicillins] Rash     All cillins          SOCIAL HISTORY    Social History     Social History    Marital status:      Spouse name: N/A    Number of children: N/A    Years of education: N/A     Social History Main Topics    Smoking status: Former Smoker     Types: Cigarettes     Quit date: 6/26/1998    Smokeless tobacco: Never Used      Comment: 5-10 cigarettes per day    Alcohol use 3.0 oz/week     3 Glasses of wine, 3 Cans of beer per week    Drug use: No    Sexual activity: Yes     Partners: Male     Birth control/ protection: None      Comment: vasectomy     Other Topics Concern    Not on file     Social History Narrative       FAMILY HISTORY  Family History   Problem Relation Age of Onset    Heart Disease Maternal Grandmother     Other Maternal Grandmother      alzhiemer's    Parkinsonism Paternal Grandfather          REVIEW OF SYSTEMS  Review of Systems   Musculoskeletal: Positive for back pain. Intermittent radicular symptoms in LE          PHYSICAL EXAMINATION  Visit Vitals    /66    Pulse 67    Temp 97.7 °F (36.5 °C) (Oral)    Resp 18    Ht 5' 6\" (1.676 m)    Wt 177 lb (80.3 kg)    SpO2 100%    BMI 28.57 kg/m2       Pain Assessment  11/8/2017   Location of Pain Back;Leg   Location Modifiers Right   Severity of Pain 7   Quality of Pain Dull;Aching; Sharp   Frequency of Pain Constant   Result of Injury No           Constitutional:  Well developed, well nourished, in no acute distress. Psychiatric: Affect and mood are appropriate. Integumentary: No rashes or abrasions noted on exposed areas. SPINE/MUSCULOSKELETAL EXAM    Cervical spine:  Neck is midline. Normal muscle tone. No focal atrophy is noted. ROM pain free. Shoulder ROM intact. No tenderness to palpation. Negative Spurling's sign. Negative Tinel's sign. Negative Pantoja's sign.       Sensation in the bilateral arms grossly intact to light touch.       Lumbar spine:  No rash, ecchymosis, or gross obliquity. No fasciculations. No focal atrophy is noted. No pain with hip ROM. Full range of motion. No tenderness to palpation. No tenderness to palpation at the sciatic notch. SI joints non-tender. Trochanters non tender. Pain with back flexion       Sensation in the bilateral legs grossly intact to light touch.       MOTOR:      Biceps  Triceps Deltoids Wrist Ext Wrist Flex Hand Intrin   Right 5/5 5/5 5/5 5/5 5/5 5/5   Left 5/5 5/5 5/5 5/5 5/5 5/5             Hip Flex  Quads Hamstrings Ankle DF EHL Ankle PF   Right 5/5 5/5 5/5 5/5 5/5 5/5   Left 5/5 5/5 5/5 5/5 5/5 5/5     Lumbar MRI 2/15/18 reviewed with patient in office today:    IMPRESSION:  1. Status post right hemilaminotomy at L5-S1. Mild to moderate degenerative disc disease at L5-S1 with small disc herniation, which causes mild mass effect on the left lateral recess and possible impingement of the descending left S1 nerve root. Mild degenerative retrolisthesis of L5 on S1. Borderline bilateral neural foraminal narrowing at L5-S1 due to small foraminal disc osteophyte complexes. 2. Borderline spinal canal stenosis at L3-L4 due to small disc herniation and advanced facet arthropathy. 3. Small disc herniation at L4-L5 and minimal disc herniation at L2-L3 without nerve root impingement.  Mild to moderate multilevel lumbar facet arthropathy. 20 minutes of face-to-face contact were spent with the patient during today's visit extensively discussing symptoms and treatment plan. All questions were answered. More than half of this visit today was spent on counseling.      Written by Papi Kendrick as dictated by Davis Pressley MD

## 2018-03-08 DIAGNOSIS — M54.16 LUMBAR RADICULOPATHY: ICD-10-CM

## 2018-04-06 DIAGNOSIS — M54.16 LUMBAR RADICULOPATHY: ICD-10-CM

## 2018-04-06 NOTE — TELEPHONE ENCOUNTER
Last Visit: 02/19/2018 with MD Rangel Arias    Next Appointment: 09/10/2018 with MD Rangel Arias   Previous Refill Encounters: 02/02/2018 per NP Miguel Angel Soliman #[id number] with 1 refill     Requested Prescriptions     Pending Prescriptions Disp Refills    gabapentin (NEURONTIN) 300 mg capsule 90 Cap 5     Sig: Take 1 Cap by mouth three (3) times daily.

## 2018-04-09 RX ORDER — GABAPENTIN 300 MG/1
300 CAPSULE ORAL 3 TIMES DAILY
Qty: 90 CAP | Refills: 5 | Status: SHIPPED | OUTPATIENT
Start: 2018-04-09

## 2018-05-29 ENCOUNTER — HOSPITAL ENCOUNTER (OUTPATIENT)
Dept: MAMMOGRAPHY | Age: 48
Discharge: HOME | End: 2018-05-29
Attending: FAMILY MEDICINE
Payer: COMMERCIAL

## 2018-05-29 DIAGNOSIS — Z12.39 BREAST CANCER SCREENING: ICD-10-CM

## 2018-05-29 PROCEDURE — 77067 SCR MAMMO BI INCL CAD: CPT

## 2021-07-27 ENCOUNTER — TRANSCRIBE ORDER (OUTPATIENT)
Dept: SCHEDULING | Age: 51
End: 2021-07-27

## 2021-07-27 DIAGNOSIS — Z12.31 VISIT FOR SCREENING MAMMOGRAM: Primary | ICD-10-CM

## 2021-08-06 ENCOUNTER — HOSPITAL ENCOUNTER (OUTPATIENT)
Dept: MAMMOGRAPHY | Age: 51
Discharge: HOME | End: 2021-08-06
Attending: FAMILY MEDICINE
Payer: COMMERCIAL

## 2021-08-06 DIAGNOSIS — Z12.31 VISIT FOR SCREENING MAMMOGRAM: ICD-10-CM

## 2021-08-06 PROCEDURE — 77063 BREAST TOMOSYNTHESIS BI: CPT

## 2022-11-07 ENCOUNTER — TRANSCRIBE ORDER (OUTPATIENT)
Dept: SCHEDULING | Age: 52
End: 2022-11-07

## 2022-11-07 DIAGNOSIS — Z12.31 VISIT FOR SCREENING MAMMOGRAM: Primary | ICD-10-CM

## 2022-11-09 ENCOUNTER — HOSPITAL ENCOUNTER (OUTPATIENT)
Dept: MAMMOGRAPHY | Age: 52
Discharge: HOME | End: 2022-11-09
Attending: STUDENT IN AN ORGANIZED HEALTH CARE EDUCATION/TRAINING PROGRAM
Payer: COMMERCIAL

## 2022-11-09 DIAGNOSIS — Z12.31 VISIT FOR SCREENING MAMMOGRAM: ICD-10-CM

## 2022-11-09 PROCEDURE — 77063 BREAST TOMOSYNTHESIS BI: CPT

## 2022-11-14 ENCOUNTER — TRANSCRIBE ORDER (OUTPATIENT)
Dept: SCHEDULING | Age: 52
End: 2022-11-14

## 2022-11-14 DIAGNOSIS — R92.8 ABNORMAL MAMMOGRAM OF LEFT BREAST: Primary | ICD-10-CM

## 2022-12-07 ENCOUNTER — HOSPITAL ENCOUNTER (OUTPATIENT)
Dept: ULTRASOUND IMAGING | Age: 52
Discharge: HOME | End: 2022-12-07
Attending: STUDENT IN AN ORGANIZED HEALTH CARE EDUCATION/TRAINING PROGRAM
Payer: COMMERCIAL

## 2022-12-07 ENCOUNTER — HOSPITAL ENCOUNTER (OUTPATIENT)
Dept: MAMMOGRAPHY | Age: 52
Discharge: HOME | End: 2022-12-07
Attending: STUDENT IN AN ORGANIZED HEALTH CARE EDUCATION/TRAINING PROGRAM
Payer: COMMERCIAL

## 2022-12-07 DIAGNOSIS — R92.8 ABNORMAL MAMMOGRAM OF LEFT BREAST: ICD-10-CM

## 2022-12-07 PROCEDURE — 77061 BREAST TOMOSYNTHESIS UNI: CPT

## 2024-01-16 ENCOUNTER — TRANSCRIBE ORDERS (OUTPATIENT)
Facility: HOSPITAL | Age: 54
End: 2024-01-16

## 2024-01-16 DIAGNOSIS — Z12.31 VISIT FOR SCREENING MAMMOGRAM: Primary | ICD-10-CM

## 2024-02-05 ENCOUNTER — HOSPITAL ENCOUNTER (OUTPATIENT)
Facility: HOSPITAL | Age: 54
Discharge: HOME | End: 2024-02-08
Payer: COMMERCIAL

## 2024-02-05 VITALS — HEIGHT: 66 IN | WEIGHT: 179 LBS | BODY MASS INDEX: 28.77 KG/M2

## 2024-02-05 DIAGNOSIS — Z12.31 VISIT FOR SCREENING MAMMOGRAM: ICD-10-CM

## 2024-02-05 PROCEDURE — 77063 BREAST TOMOSYNTHESIS BI: CPT
